# Patient Record
Sex: FEMALE | Race: BLACK OR AFRICAN AMERICAN | NOT HISPANIC OR LATINO | Employment: FULL TIME | URBAN - METROPOLITAN AREA
[De-identification: names, ages, dates, MRNs, and addresses within clinical notes are randomized per-mention and may not be internally consistent; named-entity substitution may affect disease eponyms.]

---

## 2023-12-12 ENCOUNTER — HOSPITAL ENCOUNTER (EMERGENCY)
Facility: HOSPITAL | Age: 38
Discharge: HOME | End: 2023-12-12
Payer: COMMERCIAL

## 2023-12-12 ENCOUNTER — PHARMACY VISIT (OUTPATIENT)
Dept: PHARMACY | Facility: CLINIC | Age: 38
End: 2023-12-12
Payer: MEDICAID

## 2023-12-12 ENCOUNTER — APPOINTMENT (OUTPATIENT)
Dept: RADIOLOGY | Facility: HOSPITAL | Age: 38
End: 2023-12-12
Payer: COMMERCIAL

## 2023-12-12 VITALS
HEART RATE: 100 BPM | BODY MASS INDEX: 25.69 KG/M2 | TEMPERATURE: 98.8 F | WEIGHT: 145 LBS | HEIGHT: 63 IN | DIASTOLIC BLOOD PRESSURE: 83 MMHG | SYSTOLIC BLOOD PRESSURE: 122 MMHG | OXYGEN SATURATION: 100 % | RESPIRATION RATE: 18 BRPM

## 2023-12-12 DIAGNOSIS — R10.9 FLANK PAIN: Primary | ICD-10-CM

## 2023-12-12 DIAGNOSIS — N30.00 ACUTE CYSTITIS WITHOUT HEMATURIA: ICD-10-CM

## 2023-12-12 DIAGNOSIS — J02.8 PHARYNGITIS DUE TO OTHER ORGANISM: ICD-10-CM

## 2023-12-12 LAB
ANION GAP SERPL CALC-SCNC: 15 MMOL/L (ref 10–20)
APPEARANCE UR: ABNORMAL
BASOPHILS # BLD AUTO: 0.02 X10*3/UL (ref 0–0.1)
BASOPHILS NFR BLD AUTO: 0.2 %
BILIRUB UR STRIP.AUTO-MCNC: NEGATIVE MG/DL
BUN SERPL-MCNC: 10 MG/DL (ref 6–23)
CALCIUM SERPL-MCNC: 9.1 MG/DL (ref 8.6–10.3)
CHLORIDE SERPL-SCNC: 103 MMOL/L (ref 98–107)
CO2 SERPL-SCNC: 22 MMOL/L (ref 21–32)
COLOR UR: ABNORMAL
CREAT SERPL-MCNC: 0.54 MG/DL (ref 0.5–1.05)
EOSINOPHIL # BLD AUTO: 0.01 X10*3/UL (ref 0–0.7)
EOSINOPHIL NFR BLD AUTO: 0.1 %
ERYTHROCYTE [DISTWIDTH] IN BLOOD BY AUTOMATED COUNT: 12.4 % (ref 11.5–14.5)
GFR SERPL CREATININE-BSD FRML MDRD: >90 ML/MIN/1.73M*2
GLUCOSE SERPL-MCNC: 96 MG/DL (ref 74–99)
GLUCOSE UR STRIP.AUTO-MCNC: NEGATIVE MG/DL
HCG UR QL IA.RAPID: NEGATIVE
HCT VFR BLD AUTO: 37 % (ref 36–46)
HGB BLD-MCNC: 12.3 G/DL (ref 12–16)
HOLD SPECIMEN: NORMAL
HYALINE CASTS #/AREA URNS AUTO: ABNORMAL /LPF
IMM GRANULOCYTES # BLD AUTO: 0.02 X10*3/UL (ref 0–0.7)
IMM GRANULOCYTES NFR BLD AUTO: 0.2 % (ref 0–0.9)
KETONES UR STRIP.AUTO-MCNC: ABNORMAL MG/DL
LEUKOCYTE ESTERASE UR QL STRIP.AUTO: ABNORMAL
LYMPHOCYTES # BLD AUTO: 1.97 X10*3/UL (ref 1.2–4.8)
LYMPHOCYTES NFR BLD AUTO: 23.9 %
MCH RBC QN AUTO: 25.4 PG (ref 26–34)
MCHC RBC AUTO-ENTMCNC: 33.2 G/DL (ref 32–36)
MCV RBC AUTO: 76 FL (ref 80–100)
MONOCYTES # BLD AUTO: 0.89 X10*3/UL (ref 0.1–1)
MONOCYTES NFR BLD AUTO: 10.8 %
MUCOUS THREADS #/AREA URNS AUTO: ABNORMAL /LPF
NEUTROPHILS # BLD AUTO: 5.33 X10*3/UL (ref 1.2–7.7)
NEUTROPHILS NFR BLD AUTO: 64.8 %
NITRITE UR QL STRIP.AUTO: NEGATIVE
NRBC BLD-RTO: 0 /100 WBCS (ref 0–0)
PH UR STRIP.AUTO: 5 [PH]
PLATELET # BLD AUTO: 249 X10*3/UL (ref 150–450)
POTASSIUM SERPL-SCNC: 3.5 MMOL/L (ref 3.5–5.3)
PROT UR STRIP.AUTO-MCNC: ABNORMAL MG/DL
RBC # BLD AUTO: 4.85 X10*6/UL (ref 4–5.2)
RBC # UR STRIP.AUTO: NEGATIVE /UL
RBC #/AREA URNS AUTO: ABNORMAL /HPF
S PYO DNA THROAT QL NAA+PROBE: NOT DETECTED
SODIUM SERPL-SCNC: 136 MMOL/L (ref 136–145)
SP GR UR STRIP.AUTO: 1.03
SQUAMOUS #/AREA URNS AUTO: ABNORMAL /HPF
UROBILINOGEN UR STRIP.AUTO-MCNC: 2 MG/DL
WBC # BLD AUTO: 8.2 X10*3/UL (ref 4.4–11.3)
WBC #/AREA URNS AUTO: ABNORMAL /HPF

## 2023-12-12 PROCEDURE — 2500000004 HC RX 250 GENERAL PHARMACY W/ HCPCS (ALT 636 FOR OP/ED): Performed by: NURSE PRACTITIONER

## 2023-12-12 PROCEDURE — 85025 COMPLETE CBC W/AUTO DIFF WBC: CPT | Performed by: NURSE PRACTITIONER

## 2023-12-12 PROCEDURE — RXMED WILLOW AMBULATORY MEDICATION CHARGE

## 2023-12-12 PROCEDURE — 36415 COLL VENOUS BLD VENIPUNCTURE: CPT | Performed by: NURSE PRACTITIONER

## 2023-12-12 PROCEDURE — 96361 HYDRATE IV INFUSION ADD-ON: CPT

## 2023-12-12 PROCEDURE — 96374 THER/PROPH/DIAG INJ IV PUSH: CPT

## 2023-12-12 PROCEDURE — 87086 URINE CULTURE/COLONY COUNT: CPT | Mod: PORLAB | Performed by: NURSE PRACTITIONER

## 2023-12-12 PROCEDURE — 74176 CT ABD & PELVIS W/O CONTRAST: CPT

## 2023-12-12 PROCEDURE — 81025 URINE PREGNANCY TEST: CPT | Performed by: NURSE PRACTITIONER

## 2023-12-12 PROCEDURE — 80048 BASIC METABOLIC PNL TOTAL CA: CPT | Performed by: NURSE PRACTITIONER

## 2023-12-12 PROCEDURE — 87651 STREP A DNA AMP PROBE: CPT | Performed by: NURSE PRACTITIONER

## 2023-12-12 PROCEDURE — 81001 URINALYSIS AUTO W/SCOPE: CPT | Performed by: NURSE PRACTITIONER

## 2023-12-12 PROCEDURE — 99284 EMERGENCY DEPT VISIT MOD MDM: CPT | Mod: 25

## 2023-12-12 PROCEDURE — 2500000001 HC RX 250 WO HCPCS SELF ADMINISTERED DRUGS (ALT 637 FOR MEDICARE OP): Performed by: NURSE PRACTITIONER

## 2023-12-12 PROCEDURE — 74176 CT ABD & PELVIS W/O CONTRAST: CPT | Mod: FOREIGN READ | Performed by: RADIOLOGY

## 2023-12-12 RX ORDER — KETOROLAC TROMETHAMINE 30 MG/ML
15 INJECTION, SOLUTION INTRAMUSCULAR; INTRAVENOUS ONCE
Status: COMPLETED | OUTPATIENT
Start: 2023-12-12 | End: 2023-12-12

## 2023-12-12 RX ORDER — DEXAMETHASONE 4 MG/1
10 TABLET ORAL ONCE
Status: COMPLETED | OUTPATIENT
Start: 2023-12-12 | End: 2023-12-12

## 2023-12-12 RX ORDER — CEPHALEXIN 250 MG/1
500 CAPSULE ORAL ONCE
Status: COMPLETED | OUTPATIENT
Start: 2023-12-12 | End: 2023-12-12

## 2023-12-12 RX ORDER — KETOROLAC TROMETHAMINE 30 MG/ML
INJECTION, SOLUTION INTRAMUSCULAR; INTRAVENOUS
Status: DISCONTINUED
Start: 2023-12-12 | End: 2023-12-12 | Stop reason: HOSPADM

## 2023-12-12 RX ORDER — CEPHALEXIN 500 MG/1
500 CAPSULE ORAL 4 TIMES DAILY
Qty: 28 CAPSULE | Refills: 0 | Status: SHIPPED | OUTPATIENT
Start: 2023-12-12 | End: 2023-12-19

## 2023-12-12 RX ADMIN — DEXAMETHASONE 10 MG: 4 TABLET ORAL at 20:24

## 2023-12-12 RX ADMIN — CEPHALEXIN 500 MG: 250 CAPSULE ORAL at 20:25

## 2023-12-12 RX ADMIN — KETOROLAC TROMETHAMINE 15 MG: 30 INJECTION, SOLUTION INTRAMUSCULAR at 15:37

## 2023-12-12 RX ADMIN — SODIUM CHLORIDE 1000 ML: 9 INJECTION, SOLUTION INTRAVENOUS at 15:38

## 2023-12-12 ASSESSMENT — PAIN - FUNCTIONAL ASSESSMENT: PAIN_FUNCTIONAL_ASSESSMENT: 0-10

## 2023-12-12 ASSESSMENT — LIFESTYLE VARIABLES
REASON UNABLE TO ASSESS: NO
EVER HAD A DRINK FIRST THING IN THE MORNING TO STEADY YOUR NERVES TO GET RID OF A HANGOVER: NO
HAVE YOU EVER FELT YOU SHOULD CUT DOWN ON YOUR DRINKING: NO
HAVE PEOPLE ANNOYED YOU BY CRITICIZING YOUR DRINKING: NO
EVER FELT BAD OR GUILTY ABOUT YOUR DRINKING: NO

## 2023-12-12 ASSESSMENT — COLUMBIA-SUICIDE SEVERITY RATING SCALE - C-SSRS
2. HAVE YOU ACTUALLY HAD ANY THOUGHTS OF KILLING YOURSELF?: NO
6. HAVE YOU EVER DONE ANYTHING, STARTED TO DO ANYTHING, OR PREPARED TO DO ANYTHING TO END YOUR LIFE?: NO
1. IN THE PAST MONTH, HAVE YOU WISHED YOU WERE DEAD OR WISHED YOU COULD GO TO SLEEP AND NOT WAKE UP?: NO

## 2023-12-12 ASSESSMENT — PAIN DESCRIPTION - LOCATION: LOCATION: THROAT

## 2023-12-12 ASSESSMENT — PAIN SCALES - GENERAL: PAINLEVEL_OUTOF10: 10 - WORST POSSIBLE PAIN

## 2023-12-12 NOTE — Clinical Note
Jethro Mosley was seen and treated in our emergency department on 12/12/2023.  She may return to work on 12/13/2023.       If you have any questions or concerns, please don't hesitate to call.      Shalonda Goldman, APRN-CNP

## 2023-12-12 NOTE — ED PROVIDER NOTES
Chief Complaint   Patient presents with    Flank Pain     Started yesterday. Denies hx of kidney stones. Had fever yesterday. Had sore throat for 2 days now.        HPI       38 year old female presents to the Emergency Department today complaining of a 2 day history of a sore throat. Notes that she has developed increased urinary frequency and right flank pain over the past 24 hours. Describes the pain as sharp in nature, constant, non-radiating, and varies in intensity. Denies any associated fever, chills, headache, neck pain, chest pain, shortness of breath, abdominal pain, nausea, vomiting, diarrhea, constipation, or other urinary symptoms.       History provided by:  Patient             Patient History   Past Medical History:   Diagnosis Date    Personal history of other diseases of the female genital tract     History of vaginitis     History reviewed. No pertinent surgical history.  No family history on file.  Social History     Tobacco Use    Smoking status: Not on file    Smokeless tobacco: Not on file   Substance Use Topics    Alcohol use: Not on file    Drug use: Not on file           Physical Exam  Constitutional:       Appearance: Normal appearance.   HENT:      Head: Normocephalic.      Right Ear: Tympanic membrane, ear canal and external ear normal.      Left Ear: Tympanic membrane, ear canal and external ear normal.      Nose: Nose normal.      Mouth/Throat:      Mouth: Mucous membranes are moist.      Pharynx: Oropharynx is clear. No oropharyngeal exudate, posterior oropharyngeal erythema or uvula swelling.      Tonsils: Tonsillar exudate present. No tonsillar abscesses. 3+ on the right. 3+ on the left.   Eyes:      Conjunctiva/sclera: Conjunctivae normal.      Pupils: Pupils are equal, round, and reactive to light.   Cardiovascular:      Rate and Rhythm: Normal rate and regular rhythm.      Pulses:           Radial pulses are 3+ on the right side and 3+ on the left side.        Dorsalis pedis  pulses are 3+ on the right side and 3+ on the left side.      Heart sounds: Normal heart sounds. No murmur heard.     No friction rub. No gallop.   Pulmonary:      Effort: Pulmonary effort is normal. No respiratory distress.      Breath sounds: Normal breath sounds. No wheezing, rhonchi or rales.   Abdominal:      General: Abdomen is flat. Bowel sounds are normal.      Palpations: Abdomen is soft.      Tenderness: There is no abdominal tenderness. There is no right CVA tenderness, left CVA tenderness, guarding or rebound. Negative signs include Flowers's sign and McBurney's sign.   Musculoskeletal:         General: No swelling or deformity.      Cervical back: Full passive range of motion without pain.      Right lower leg: No edema.      Left lower leg: No edema.   Lymphadenopathy:      Cervical: No cervical adenopathy.   Skin:     Capillary Refill: Capillary refill takes less than 2 seconds.      Coloration: Skin is not jaundiced.      Findings: No rash.   Neurological:      General: No focal deficit present.      Mental Status: She is alert and oriented to person, place, and time. Mental status is at baseline.      Gait: Gait is intact.   Psychiatric:         Mood and Affect: Mood normal.         Behavior: Behavior is cooperative.         Labs Reviewed   CBC WITH AUTO DIFFERENTIAL - Abnormal       Result Value    WBC 8.2      nRBC 0.0      RBC 4.85      Hemoglobin 12.3      Hematocrit 37.0      MCV 76 (*)     MCH 25.4 (*)     MCHC 33.2      RDW 12.4      Platelets 249      Neutrophils % 64.8      Immature Granulocytes %, Automated 0.2      Lymphocytes % 23.9      Monocytes % 10.8      Eosinophils % 0.1      Basophils % 0.2      Neutrophils Absolute 5.33      Immature Granulocytes Absolute, Automated 0.02      Lymphocytes Absolute 1.97      Monocytes Absolute 0.89      Eosinophils Absolute 0.01      Basophils Absolute 0.02     URINALYSIS WITH REFLEX MICROSCOPIC AND CULTURE - Abnormal    Color, Urine Carol (*)      Appearance, Urine Hazy (*)     Specific Gravity, Urine 1.029      pH, Urine 5.0      Protein, Urine 100 (2+) (*)     Glucose, Urine NEGATIVE      Blood, Urine NEGATIVE      Ketones, Urine 20 (1+) (*)     Bilirubin, Urine NEGATIVE      Urobilinogen, Urine 2.0 (*)     Nitrite, Urine NEGATIVE      Leukocyte Esterase, Urine TRACE (*)    MICROSCOPIC ONLY, URINE - Abnormal    WBC, Urine 11-20 (*)     RBC, Urine 1-2      Squamous Epithelial Cells, Urine 1-9 (SPARSE)      Mucus, Urine 4+      Hyaline Casts, Urine OCCASIONAL (*)    GROUP A STREPTOCOCCUS, PCR - Normal    Group A Strep PCR Not Detected     BASIC METABOLIC PANEL - Normal    Glucose 96      Sodium 136      Potassium 3.5      Chloride 103      Bicarbonate 22      Anion Gap 15      Urea Nitrogen 10      Creatinine 0.54      eGFR >90      Calcium 9.1     HCG, URINE, QUALITATIVE - Normal    HCG, Urine NEGATIVE     URINE CULTURE   URINALYSIS WITH REFLEX MICROSCOPIC AND CULTURE    Narrative:     The following orders were created for panel order Urinalysis with Reflex Microscopic and Culture.  Procedure                               Abnormality         Status                     ---------                               -----------         ------                     Urinalysis with Reflex M...[[id number]]  Abnormal            Final result               Extra Urine Gray Tube[[id number]]                            Final result                 Please view results for these tests on the individual orders.   EXTRA URINE GRAY TUBE    Extra Tube Hold for add-ons.         CT abdomen pelvis wo IV contrast   Final Result   1. No acute process. No renal/ureteral calculi.   2. Trace pelvic free fluid.   Signed by Rusty Delacruz MD               ED Course & MDM   Diagnoses as of 12/12/23 1814   Flank pain   Acute cystitis without hematuria           Medical Decision Making  Patient was seen and evaluated by myself in triage per [facility] protocol. History and physical was obtained.  Orders were placed based on such.     Karan HESS CNP     Patient was initially seen in triage department as a part of the triage process by NICKIE.  I did review all of her results.  She does have urinary frequency as well as some right flank pain.  She was given Toradol in the ED with improvement in the pain.  Repeat physical exam patient remains well-appearing in the emergency department.  There is no tachycardia.  She does have tonsillar exudates and swelling.  BMP is within normal limits there is no obvious peritonsillar abscess.  Urine is positive for leukocytes and white blood cells.  She is symptomatic her urine culture is pending.  CBC shows leukocytosis or signs of anemia.  CT shows no acute process or renal calculi trace pelvic fluid.  She is not having any pelvic pain.  Will prescribe patient Keflex 4 times a day x 7 days which will cover both urinary tract infection as well as pharyngitis.  Discussed results in depth with the patient to perform meds but she verbalized understanding of this plan has no further questions or concerns we discharged in stable condition.           Your medication list        START taking these medications        Instructions Last Dose Given Next Dose Due   cephalexin 500 mg capsule  Commonly known as: Keflex      Take 1 capsule (500 mg) by mouth 4 times a day for 7 days.                 Where to Get Your Medications        These medications were sent to [facility]  [address] [address]      Hours: 8AM to 6PM Mon-Fri, 8AM to 2PM Sat, 8AM to 12PM Sun Phone: [phone]   cephalexin 500 mg capsule           Procedure  Procedures     Shalonda Goldman, IFEANYI-MARIUSZ  12/12/23 5296

## 2023-12-13 LAB — BACTERIA UR CULT: NO GROWTH

## 2024-02-12 ENCOUNTER — APPOINTMENT (OUTPATIENT)
Dept: RADIOLOGY | Facility: CLINIC | Age: 39
End: 2024-02-12
Payer: COMMERCIAL

## 2024-02-12 ENCOUNTER — HOSPITAL ENCOUNTER (OUTPATIENT)
Dept: RADIOLOGY | Facility: HOSPITAL | Age: 39
Discharge: HOME | End: 2024-02-12
Payer: COMMERCIAL

## 2024-02-12 DIAGNOSIS — M70.32 OTHER BURSITIS OF ELBOW, LEFT ELBOW: ICD-10-CM

## 2024-02-12 DIAGNOSIS — Z12.31 ENCOUNTER FOR SCREENING MAMMOGRAM FOR MALIGNANT NEOPLASM OF BREAST: ICD-10-CM

## 2024-02-12 PROCEDURE — 77067 SCR MAMMO BI INCL CAD: CPT | Performed by: RADIOLOGY

## 2024-02-12 PROCEDURE — 77067 SCR MAMMO BI INCL CAD: CPT

## 2024-02-12 PROCEDURE — 77063 BREAST TOMOSYNTHESIS BI: CPT | Performed by: RADIOLOGY

## 2024-02-12 PROCEDURE — 73070 X-RAY EXAM OF ELBOW: CPT | Mod: LT

## 2024-02-23 ENCOUNTER — HOSPITAL ENCOUNTER (OUTPATIENT)
Dept: RADIOLOGY | Facility: CLINIC | Age: 39
Discharge: HOME | End: 2024-02-23
Payer: COMMERCIAL

## 2024-02-23 DIAGNOSIS — E05.90 THYROTOXICOSIS, UNSPECIFIED WITHOUT THYROTOXIC CRISIS OR STORM: ICD-10-CM

## 2024-02-23 PROCEDURE — 76536 US EXAM OF HEAD AND NECK: CPT | Performed by: STUDENT IN AN ORGANIZED HEALTH CARE EDUCATION/TRAINING PROGRAM

## 2024-02-23 PROCEDURE — 76536 US EXAM OF HEAD AND NECK: CPT

## 2024-03-18 ENCOUNTER — OFFICE VISIT (OUTPATIENT)
Dept: ORTHOPEDIC SURGERY | Facility: CLINIC | Age: 39
End: 2024-03-18
Payer: COMMERCIAL

## 2024-03-18 ENCOUNTER — PREP FOR PROCEDURE (OUTPATIENT)
Dept: ORTHOPEDIC SURGERY | Facility: CLINIC | Age: 39
End: 2024-03-18

## 2024-03-18 ENCOUNTER — HOSPITAL ENCOUNTER (OUTPATIENT)
Dept: RADIOLOGY | Facility: HOSPITAL | Age: 39
Discharge: HOME | End: 2024-03-18
Payer: COMMERCIAL

## 2024-03-18 DIAGNOSIS — M67.431 GANGLION CYST OF VOLAR ASPECT OF RIGHT WRIST: Primary | ICD-10-CM

## 2024-03-18 DIAGNOSIS — M25.531 WRIST PAIN, ACUTE, RIGHT: ICD-10-CM

## 2024-03-18 DIAGNOSIS — M77.02 MEDIAL EPICONDYLITIS OF ELBOW, LEFT: ICD-10-CM

## 2024-03-18 PROCEDURE — 73110 X-RAY EXAM OF WRIST: CPT | Mod: RIGHT SIDE | Performed by: RADIOLOGY

## 2024-03-18 PROCEDURE — L3908 WHO COCK-UP NONMOLDE PRE OTS: HCPCS | Performed by: ORTHOPAEDIC SURGERY

## 2024-03-18 PROCEDURE — 99204 OFFICE O/P NEW MOD 45 MIN: CPT | Performed by: ORTHOPAEDIC SURGERY

## 2024-03-18 PROCEDURE — 73110 X-RAY EXAM OF WRIST: CPT | Mod: RT

## 2024-03-18 NOTE — PROGRESS NOTES
New patient left elbow pain x a year no known injury xrays done 2/2024 and developed a cyst on right wrist - no xrays   Right hand dom

## 2024-03-18 NOTE — H&P (VIEW-ONLY)
38 y.o. female presents today for evaluation of left medial sided elbow pain and right volar wrist mass. The patient reports elbow pain for several months, getting worse, worse when pressure is put on it.  She also reports right wrist mass for about a year and a half getting more painful and worse.  Pain is controlled. Patient reports no numbness and tingling.  Reports no previous surgeries, injections, or trauma to the area.  Reports pain worse with use, better at rest.   Pain dull ache, sharp at times and elbow especially when she leans on things.  And the pain in her wrist is a dull ache that is worse with motion and use.  States she would like the right wrist mass removed as it has been around for a long time and not getting any better.    Review of Systems    Constitutional: see HPI, no fever, no chills, not feeling tired, no significant weight gain or weight loss.   Eyes: No vision changes  ENT: no nosebleeds.   Cardiovascular: no chest pain.   Respiratory: no shortness of breath and no cough.   Gastrointestinal: no abdominal pain, no nausea, no vomiting and no diarrhea.   Musculoskeletal: per HPI  Neurological: no headache, no gait disturbances  Psychiatric: no depression and no sleep disturbances.   Endocrine: no muscle weakness and no muscle cramps.   Hematologic/Lymphatic: no swollen glands and no tendency for easy bruising or excessive swelling.     Patient's past medical history, past surgical history, allergies, and medications have been reviewed unless otherwise noted in the chart.      Mass  Location: Right volar radial wrist specific: Ganglion cyst inspection (mass size): 1 cm x 1 cm palpation:  soft, mobile, compressible, Range of Motion:  full wrist and finger motion, Stability:  no instability noted, Strength:  5/5 hand and wrist, Skin:  intact, Vascular:  capillary refill <2 seconds distally, Sensation:  sensation intact to light touch distally.       Medial Epicondylitis  Inspection:  no  evidence of infection, no erythema, no edema, Palpation:  compartments are soft, positive pain with palpation over the medial epicondyle, Range of Motion:  full elbow motion, Stability:  no elbow instability noted, Strength:  5/5 elbow flexion/extension, Skin:  intact, Vascular:  capillary refill <2 seconds distally, Sensation:  intact to light touch distally, Tests:  pain with resisted wrist/finger flexion over the medial epicondyle.     Constitutional   General appearance: Alert and in no acute distress. Well developed, well nourished.    Eyes   External Eye, Conjunctiva and lids: Normal external exam - pupils were equal in size, round, reactive to light (PERRL) with normal accommodation and extraocular movements intact (EOMI).   Ears, Nose, Mouth, and Throat   Hearing: Normal.   Neck   Neck: No neck mass was observed. Supple.   Pulmonary   Respiratory effort: No respiratory distress.   Auscultation of lungs: Clear bilateral breath sounds.   Cardiovascular   Examination of extremities: No peripheral edema.   Auscultation of heart: Heart rate and rhythm were normal   Psychiatric   Judgment and insight: Intact.   Orientation to person, place, and time: Alert and oriented x 3.       Mood and affect: Normal.      X-ray of her left elbow shows no fractures dislocations or osseous abnormalities    Left medial epicondylitis  Based on the history, physical exam and imaging studies above, the patient's presentation is consistent with the above diagnosis.  I had a long discussion with the patient regarding their presentation and the treatment options.  We discussed initial nonoperative versus operative management options as well as potential further diagnostic imaging.  We again discussed her treatment options going forward along with their associated risks and benefits. After thorough discussion, the patient has elected to proceed with conservative management. All questions were answered to the patients satisfaction who  seems satisfied with the plan.  They will call the office with any questions/concerns.    Cock up wrist splint  Voltaren gel as needed  Hand master plus  Follow-up in 8 weeks as needed    Right volar wrist ganglion cyst versus mass  Surgery discussion: I discussed the diagnosis and treatment options with the patient today along with their associated risks and benefits. After thorough discussion, the patient has elected to proceed with surgical intervention. The patient understands the risks of,including, but not limited to, bleeding, infection, loss of life or limb, pain, permanent numbness, tingling, weakness, loss of motion, failure of the goals of surgery or the potential need for additional surgery. The patient would like to accept these risks and proceed. All questions were answered to the patients satisfaction and the patient seems satisfied with the plan.    Plan right wrist mass excision with 2 weeks splinting -I discussed conservative treatment including continued observation which is beyond the 1 year esau and will likely not provide any relief versus splinting and anti-inflammatory use.  She states she would rather have it removed which I think is reasonable given the chronicity of the mass  X-ray of her wrist on the way out  Follow-up 2 weeks after for suture removal and splint removal

## 2024-03-20 ENCOUNTER — ANESTHESIA EVENT (OUTPATIENT)
Dept: OPERATING ROOM | Facility: HOSPITAL | Age: 39
End: 2024-03-20
Payer: COMMERCIAL

## 2024-03-26 ENCOUNTER — PHARMACY VISIT (OUTPATIENT)
Dept: PHARMACY | Facility: CLINIC | Age: 39
End: 2024-03-26
Payer: MEDICAID

## 2024-03-26 ENCOUNTER — HOSPITAL ENCOUNTER (OUTPATIENT)
Facility: HOSPITAL | Age: 39
Setting detail: OUTPATIENT SURGERY
Discharge: HOME | End: 2024-03-26
Attending: ORTHOPAEDIC SURGERY | Admitting: ORTHOPAEDIC SURGERY
Payer: COMMERCIAL

## 2024-03-26 ENCOUNTER — ANESTHESIA (OUTPATIENT)
Dept: OPERATING ROOM | Facility: HOSPITAL | Age: 39
End: 2024-03-26
Payer: COMMERCIAL

## 2024-03-26 VITALS
HEIGHT: 63 IN | HEART RATE: 89 BPM | RESPIRATION RATE: 16 BRPM | BODY MASS INDEX: 25.69 KG/M2 | DIASTOLIC BLOOD PRESSURE: 80 MMHG | WEIGHT: 145 LBS | OXYGEN SATURATION: 100 % | SYSTOLIC BLOOD PRESSURE: 118 MMHG | TEMPERATURE: 97.5 F

## 2024-03-26 DIAGNOSIS — M67.431 GANGLION CYST OF VOLAR ASPECT OF RIGHT WRIST: Primary | ICD-10-CM

## 2024-03-26 LAB — PREGNANCY TEST URINE, POC: NEGATIVE

## 2024-03-26 PROCEDURE — 2500000004 HC RX 250 GENERAL PHARMACY W/ HCPCS (ALT 636 FOR OP/ED): Performed by: NURSE ANESTHETIST, CERTIFIED REGISTERED

## 2024-03-26 PROCEDURE — 7100000010 HC PHASE TWO TIME - EACH INCREMENTAL 1 MINUTE: Performed by: ORTHOPAEDIC SURGERY

## 2024-03-26 PROCEDURE — 3700000002 HC GENERAL ANESTHESIA TIME - EACH INCREMENTAL 1 MINUTE: Performed by: ORTHOPAEDIC SURGERY

## 2024-03-26 PROCEDURE — 3600000003 HC OR TIME - INITIAL BASE CHARGE - PROCEDURE LEVEL THREE: Performed by: ORTHOPAEDIC SURGERY

## 2024-03-26 PROCEDURE — 3700000001 HC GENERAL ANESTHESIA TIME - INITIAL BASE CHARGE: Performed by: ORTHOPAEDIC SURGERY

## 2024-03-26 PROCEDURE — 25076 EXC FOREARM TUM DEEP < 3 CM: CPT | Performed by: ORTHOPAEDIC SURGERY

## 2024-03-26 PROCEDURE — 2500000005 HC RX 250 GENERAL PHARMACY W/O HCPCS: Performed by: NURSE ANESTHETIST, CERTIFIED REGISTERED

## 2024-03-26 PROCEDURE — 2500000005 HC RX 250 GENERAL PHARMACY W/O HCPCS: Performed by: ORTHOPAEDIC SURGERY

## 2024-03-26 PROCEDURE — 88304 TISSUE EXAM BY PATHOLOGIST: CPT | Performed by: PATHOLOGY

## 2024-03-26 PROCEDURE — 0752T DGTZ GLS MCRSCP SLD LVL III: CPT | Mod: TC,PORLAB,WESLAB | Performed by: ORTHOPAEDIC SURGERY

## 2024-03-26 PROCEDURE — 81025 URINE PREGNANCY TEST: CPT | Performed by: ANESTHESIOLOGY

## 2024-03-26 PROCEDURE — 3600000008 HC OR TIME - EACH INCREMENTAL 1 MINUTE - PROCEDURE LEVEL THREE: Performed by: ORTHOPAEDIC SURGERY

## 2024-03-26 PROCEDURE — 7100000009 HC PHASE TWO TIME - INITIAL BASE CHARGE: Performed by: ORTHOPAEDIC SURGERY

## 2024-03-26 PROCEDURE — RXMED WILLOW AMBULATORY MEDICATION CHARGE

## 2024-03-26 PROCEDURE — 2500000004 HC RX 250 GENERAL PHARMACY W/ HCPCS (ALT 636 FOR OP/ED): Performed by: ANESTHESIOLOGY

## 2024-03-26 RX ORDER — OXYCODONE AND ACETAMINOPHEN 5; 325 MG/1; MG/1
1 TABLET ORAL EVERY 4 HOURS PRN
Status: DISCONTINUED | OUTPATIENT
Start: 2024-03-26 | End: 2024-03-26 | Stop reason: HOSPADM

## 2024-03-26 RX ORDER — FENTANYL CITRATE 50 UG/ML
INJECTION, SOLUTION INTRAMUSCULAR; INTRAVENOUS AS NEEDED
Status: DISCONTINUED | OUTPATIENT
Start: 2024-03-26 | End: 2024-03-26

## 2024-03-26 RX ORDER — HYDRALAZINE HYDROCHLORIDE 20 MG/ML
5 INJECTION INTRAMUSCULAR; INTRAVENOUS EVERY 30 MIN PRN
Status: DISCONTINUED | OUTPATIENT
Start: 2024-03-26 | End: 2024-03-26 | Stop reason: HOSPADM

## 2024-03-26 RX ORDER — MIDAZOLAM HYDROCHLORIDE 1 MG/ML
INJECTION, SOLUTION INTRAMUSCULAR; INTRAVENOUS AS NEEDED
Status: DISCONTINUED | OUTPATIENT
Start: 2024-03-26 | End: 2024-03-26

## 2024-03-26 RX ORDER — LABETALOL HYDROCHLORIDE 5 MG/ML
5 INJECTION, SOLUTION INTRAVENOUS ONCE AS NEEDED
Status: DISCONTINUED | OUTPATIENT
Start: 2024-03-26 | End: 2024-03-26 | Stop reason: HOSPADM

## 2024-03-26 RX ORDER — HYDROCODONE BITARTRATE AND ACETAMINOPHEN 5; 325 MG/1; MG/1
1 TABLET ORAL EVERY 6 HOURS PRN
Qty: 15 TABLET | Refills: 0 | Status: SHIPPED | OUTPATIENT
Start: 2024-03-26

## 2024-03-26 RX ORDER — LIDOCAINE HYDROCHLORIDE 5 MG/ML
INJECTION, SOLUTION INFILTRATION; INTRAVENOUS AS NEEDED
Status: DISCONTINUED | OUTPATIENT
Start: 2024-03-26 | End: 2024-03-26

## 2024-03-26 RX ORDER — FAMOTIDINE 10 MG/ML
20 INJECTION INTRAVENOUS ONCE
Status: COMPLETED | OUTPATIENT
Start: 2024-03-26 | End: 2024-03-26

## 2024-03-26 RX ORDER — BUPIVACAINE HCL/EPINEPHRINE 0.5-1:200K
VIAL (ML) INJECTION AS NEEDED
Status: DISCONTINUED | OUTPATIENT
Start: 2024-03-26 | End: 2024-03-26 | Stop reason: HOSPADM

## 2024-03-26 RX ORDER — DIPHENHYDRAMINE HYDROCHLORIDE 50 MG/ML
12.5 INJECTION INTRAMUSCULAR; INTRAVENOUS ONCE AS NEEDED
Status: DISCONTINUED | OUTPATIENT
Start: 2024-03-26 | End: 2024-03-26 | Stop reason: HOSPADM

## 2024-03-26 RX ORDER — CEFAZOLIN SODIUM 1 G/50ML
SOLUTION INTRAVENOUS AS NEEDED
Status: DISCONTINUED | OUTPATIENT
Start: 2024-03-26 | End: 2024-03-26

## 2024-03-26 RX ORDER — MEPERIDINE HYDROCHLORIDE 25 MG/ML
12.5 INJECTION INTRAMUSCULAR; INTRAVENOUS; SUBCUTANEOUS EVERY 10 MIN PRN
Status: DISCONTINUED | OUTPATIENT
Start: 2024-03-26 | End: 2024-03-26 | Stop reason: HOSPADM

## 2024-03-26 RX ORDER — SODIUM CHLORIDE, SODIUM LACTATE, POTASSIUM CHLORIDE, CALCIUM CHLORIDE 600; 310; 30; 20 MG/100ML; MG/100ML; MG/100ML; MG/100ML
100 INJECTION, SOLUTION INTRAVENOUS CONTINUOUS
Status: DISCONTINUED | OUTPATIENT
Start: 2024-03-26 | End: 2024-03-26 | Stop reason: HOSPADM

## 2024-03-26 RX ORDER — PROPOFOL 10 MG/ML
INJECTION, EMULSION INTRAVENOUS AS NEEDED
Status: DISCONTINUED | OUTPATIENT
Start: 2024-03-26 | End: 2024-03-26

## 2024-03-26 RX ORDER — ALBUTEROL SULFATE 0.83 MG/ML
2.5 SOLUTION RESPIRATORY (INHALATION) ONCE AS NEEDED
Status: DISCONTINUED | OUTPATIENT
Start: 2024-03-26 | End: 2024-03-26 | Stop reason: HOSPADM

## 2024-03-26 RX ORDER — LIDOCAINE HYDROCHLORIDE 10 MG/ML
0.1 INJECTION, SOLUTION EPIDURAL; INFILTRATION; INTRACAUDAL; PERINEURAL ONCE
Status: DISCONTINUED | OUTPATIENT
Start: 2024-03-26 | End: 2024-03-26 | Stop reason: HOSPADM

## 2024-03-26 RX ORDER — ONDANSETRON HYDROCHLORIDE 2 MG/ML
4 INJECTION, SOLUTION INTRAVENOUS ONCE AS NEEDED
Status: DISCONTINUED | OUTPATIENT
Start: 2024-03-26 | End: 2024-03-26 | Stop reason: HOSPADM

## 2024-03-26 RX ORDER — MORPHINE SULFATE 2 MG/ML
2 INJECTION, SOLUTION INTRAMUSCULAR; INTRAVENOUS EVERY 5 MIN PRN
Status: DISCONTINUED | OUTPATIENT
Start: 2024-03-26 | End: 2024-03-26 | Stop reason: HOSPADM

## 2024-03-26 RX ORDER — DROPERIDOL 2.5 MG/ML
0.62 INJECTION, SOLUTION INTRAMUSCULAR; INTRAVENOUS ONCE AS NEEDED
Status: DISCONTINUED | OUTPATIENT
Start: 2024-03-26 | End: 2024-03-26 | Stop reason: HOSPADM

## 2024-03-26 RX ADMIN — PROPOFOL 350 MG: 10 INJECTION, EMULSION INTRAVENOUS at 13:05

## 2024-03-26 RX ADMIN — CEFAZOLIN SODIUM 2 G: 1 INJECTION, SOLUTION INTRAVENOUS at 13:21

## 2024-03-26 RX ADMIN — FAMOTIDINE 20 MG: 10 INJECTION, SOLUTION INTRAVENOUS at 11:14

## 2024-03-26 RX ADMIN — LIDOCAINE HYDROCHLORIDE 30 ML: 5 INJECTION, SOLUTION INFILTRATION; INTRAVENOUS at 13:05

## 2024-03-26 RX ADMIN — FENTANYL CITRATE 50 MCG: 50 INJECTION INTRAMUSCULAR; INTRAVENOUS at 13:11

## 2024-03-26 RX ADMIN — MIDAZOLAM 2 MG: 1 INJECTION INTRAMUSCULAR; INTRAVENOUS at 12:54

## 2024-03-26 RX ADMIN — SODIUM CHLORIDE, POTASSIUM CHLORIDE, SODIUM LACTATE AND CALCIUM CHLORIDE 100 ML/HR: 600; 310; 30; 20 INJECTION, SOLUTION INTRAVENOUS at 11:15

## 2024-03-26 RX ADMIN — FENTANYL CITRATE 50 MCG: 50 INJECTION INTRAMUSCULAR; INTRAVENOUS at 13:07

## 2024-03-26 SDOH — HEALTH STABILITY: MENTAL HEALTH: CURRENT SMOKER: 1

## 2024-03-26 ASSESSMENT — PAIN - FUNCTIONAL ASSESSMENT: PAIN_FUNCTIONAL_ASSESSMENT: 0-10

## 2024-03-26 ASSESSMENT — PAIN SCALES - GENERAL
PAINLEVEL_OUTOF10: 0 - NO PAIN
PAIN_LEVEL: 0

## 2024-03-26 NOTE — OP NOTE
ORTHOPEDIC OPERATIVE NOTE    Name: Jethro Msoley  : 1985  Surgeon: Pieter Mae DO  Facility: [facility]  Date of Surgery: 24     SURGEON:  Pieter Mae DO  ASSISTANT:  None  PRE OP DIAGNOSIS:  Right Wrist Volar Mass (Ganglion Cyst)  POST OP DIAGNOSIS:  Same  PROCEDURE:   Right Wrist Volar Arthrotomy with Excision Mass (Ganglion Cyst)  ANESTHESIA:   Gibbs Block with IV Sedation  GROSS PATHOLOGY:               Ganglion cyst  BLOOD LOSS: Minimal    Indications: This is a 38-year-old female whose had a right wrist mass for over a year that is recently gotten worse.  She has tried activity modification and observation without improvement of her symptoms.  She understands the risks, benefits, complications, and reasonable expectations of undergoing surgical intervention and she was willing to proceed.  I think it is reasonable given that the mass has persisted for over a year and is causing her pain she no longer wants to tolerate and is limiting her from some activities.     PROCEDURE: The patient was taken to the operative suite where IV sedation was performed by the Department of Anesthesia in addition to a charles block.  Tourniquet inflated to 250 mm Hg. The hand was prepped and draped in the usual manner.    A longitudinal volar radial incision was created over the palpable cyst, about 4 cm in length. Dissection was taken through the skin sharply and then bluntly through the subcutaneous tissue to avoid injury to the superficial sensory nerves.    The ganglion cyst was identified and dissected bluntly through the subcutaneous tissue. The procedure was performed under 3.5 Loupe magnification, and great care was taken to avoid vital structures, especially the radial artery and its branches. Dissection was taken down to the volar wrist capsule.    Arthrotomy was then performed exposing the stalk of the cyst and the wrist joint. This area was debrided with a rongeur. Only a small portion of  capsule was excised to avoid any joint instability. Visualization of the wrist joint in this area demonstrated normal findings.    The cyst was sent to the Laboratory of analysis. The wound was thoroughly irrigated.  Capsule closed with 4-0 Vicryl .  Tourniquet deflated and hemostasis obtained with electrocautery and adequate perfusion returned to the hand.  Deep tissues closed with 4-0 vicryl.  The skin was closed with running 5-0 Nylon suture. I then injected the line of my incision with a combination of Xylocaine and Marcaine with Epinephrine totaling 10 cc.    A soft, bulky dressing with a volar wrist splint was applied and the patient was taken to the recovery room in satisfactory condition.    Plan: Patient will follow-up in the office in 2 weeks for wound check and splint removal.  They are given appropriate pain analgesia medication and discharge instructions      Electronically signed  Pieter Mae DO

## 2024-03-26 NOTE — ANESTHESIA PREPROCEDURE EVALUATION
Patient: Jethro Mosley    Procedure Information       Date/Time: 03/26/24 1200    Procedure: RIGHT WRIST GANGLION CYST EXCISION (Beir block/mac) (Right: Wrist) - LOCAL/MAC - 15 MINS    Location: [facility] OR 07 / Virtual [facility] OR    Surgeons: Ag Mae, DO            Relevant Problems   Anesthesia (within normal limits)       Clinical information reviewed:   Tobacco  Allergies  Meds   Med Hx  Surg Hx   Fam Hx  Soc Hx        NPO Detail:  No data recorded     Physical Exam    Airway  Mallampati: II  TM distance: >3 FB  Neck ROM: full     Cardiovascular - normal exam     Dental - normal exam     Pulmonary - normal exam     Abdominal - normal exam           Anesthesia Plan    History of general anesthesia?: yes  History of complications of general anesthesia?: no    ASA 2     MAC and regional   (MAC  Right [place] Block)  The patient is a current smoker.  Patient was previously instructed to abstain from smoking on day of procedure.  Patient did not smoke on day of procedure.    intravenous induction   Postoperative administration of opioids is intended.  Anesthetic plan and risks discussed with patient.  Use of blood products discussed with patient who.    Plan discussed with CRNA.

## 2024-03-26 NOTE — ANESTHESIA POSTPROCEDURE EVALUATION
Patient: Jethro Mosley    Procedure Summary       Date: 03/26/24 Room / Location: [facility] OR 01 / Virtual [facility] OR    Anesthesia Start: 1253 Anesthesia Stop: 1344    Procedure: RIGHT WRIST GANGLION CYST EXCISION (Beir block/mac) (Right: Wrist) Diagnosis:       Ganglion cyst of volar aspect of right wrist      (Ganglion cyst of volar aspect of right wrist [M67.431])    Surgeons: Ag Mae DO Responsible Provider: CHRISTO Dawn    Anesthesia Type: MAC, regional ASA Status: 2            Anesthesia Type: MAC, regional    Vitals Value Taken Time   /80 03/26/24 1431   Temp 36.4 °C (97.5 °F) 03/26/24 1420   Pulse 92 03/26/24 1434   Resp 16 03/26/24 1434   SpO2 100 % 03/26/24 1435   Vitals shown include unvalidated device data.    Anesthesia Post Evaluation    Patient location during evaluation: PACU  Patient participation: complete - patient participated  Level of consciousness: awake  Pain score: 0  Pain management: adequate  Airway patency: patent  Cardiovascular status: acceptable  Respiratory status: acceptable  Hydration status: acceptable  Postoperative Nausea and Vomiting: none    No notable events documented.

## 2024-03-26 NOTE — DISCHARGE INSTRUCTIONS
[facility]  [phone]   Fax: [phone] [address]    [address]  [address] [place], [place] [address] [place] [id number]    Upper Extremity   Post-Operatively (After Surgery) - Ganglion Cyst    1. Post-Operative Course    Following surgery, your surgeon may see your family in the family call room. Once stable, and in the Post Anesthesia Care Unit (PACU), you will be transported to your hospital room or allowed to go home if an outpatient procedure.     2. Dressing    You have a splint (hard casting material), do NOT remove the dressing until follow up. DO NOT GET DRESSING WET! Once at home, if your dressing, splint, or cast feels too tight or mistakenly gets wet, please contact the office. Further dressing instructions may be given at surgery.     If your sutures are visible (black strings), they will be removed about 10-14 days after surgery.  If you do not see any sutures, then they are absorbable and will not need to be removed.  In either case, you should have an appointment to see your physician 10-14 after surgery.    3. Activity     Most people underestimate the length of time required to fully recover from surgery. If you had a block (where your arm feels numb), the sensation typically returns around 18-24 hours later.  It is recommended you take some pain medication the evening following your surgery so when the block wears off (in the middle of the night), you are not in severe pain.   With pain medication, start at the lowest dose that controls your pain.       After the first 1-3 days, we encourage you to balance your activity between ambulation, sitting in a chair,   and resting in bed with your arm elevated. Let swelling and pain be your guide to activity, you should   avoid prolonged (over 20 minutes) standing or sitting. In general, limit your activities to home for the first   1-3 days.    4. Pain    You will be discharged to home with  a prescription for oral pain medication. A most important factor in pain control is rest and elevation. Ice may also be applied to the operative site for 30 minute intervals. Please use a waterproof bag for ice or cold packs.  Again, as you feel the numbness resolving and some onset of discomfort, please take pain medication, don't wait till full resolution of block.   Try to use the lowest dose of pain medication that controls your pain.      The pain medication may cause constipation. Drink plenty of fluids, eat fruits and vegetables. You may use an over the counter laxative or stool softener if necessary. Take pain medication with some food in your stomach, as prescribed by your pharmacist.     5. Elevation   Elevation of the operative extremity is critical. Elevation reduces swelling and minimizes pain. Less swelling is associated with a lower infectious rate, fewer wound complications, less post-operative stiffness, and more rapid recovery of function. To keep the swelling down, your hand must be kept above the level of your heart.

## 2024-03-26 NOTE — ANESTHESIA PROCEDURE NOTES
Peripheral Block    Patient location during procedure: OR  Start time: 3/26/2024 1:05 PM  End time: 3/26/2024 1:07 PM  Reason for block: at surgeon's request  Staffing  Performed: CRNA   Authorized by: CHRISTO Danw    Performed by: CHRISTO Dawn  Preanesthetic Checklist  Completed: patient identified, IV checked, site marked, risks and benefits discussed, surgical consent, monitors and equipment checked, pre-op evaluation and timeout performed   Timeout performed at: 3/26/2024 1:04 PM  Peripheral Block  Patient position: laying flat  Prep: Betadine  Patient monitoring: heart rate, cardiac monitor and continuous pulse ox  Block type: Kiko block  Laterality: right  Injection technique: single-shot  Local infiltration: lidocaine  Infiltration strength: 0.5 %  Dose: 30 mL  Needle  Test dose: negative  Assessment  Injection assessment: negative aspiration for heme  Paresthesia pain: none  Heart rate change: no  Slow fractionated injection: yes

## 2024-04-02 LAB
LABORATORY COMMENT REPORT: NORMAL
PATH REPORT.FINAL DX SPEC: NORMAL
PATH REPORT.GROSS SPEC: NORMAL
PATH REPORT.RELEVANT HX SPEC: NORMAL
PATH REPORT.TOTAL CANCER: NORMAL

## 2024-04-08 ENCOUNTER — OFFICE VISIT (OUTPATIENT)
Dept: ORTHOPEDIC SURGERY | Facility: CLINIC | Age: 39
End: 2024-04-08
Payer: COMMERCIAL

## 2024-04-08 DIAGNOSIS — M67.431 GANGLION CYST OF VOLAR ASPECT OF RIGHT WRIST: Primary | ICD-10-CM

## 2024-04-08 PROCEDURE — 99024 POSTOP FOLLOW-UP VISIT: CPT | Performed by: ORTHOPAEDIC SURGERY

## 2024-04-08 NOTE — PROGRESS NOTES
38 y.o. female presents today for for follow up after right volar wrist ganglion cyst excision. The patient reports doing well, no issues. The DOS was 2 weeks ago. Pain is controlled. Patient denies numbness and tingling.  Splint has been worn as directed.   Would like to go back to work    Review of Systems    Constitutional: see HPI, no fever, no chills, not feeling tired, no significant weight gain or weight loss.   Eyes: No vision changes  ENT: no nosebleeds.   Cardiovascular: no chest pain.   Respiratory: no shortness of breath and no cough.   Gastrointestinal: no abdominal pain, no nausea, no vomiting and no diarrhea.   Musculoskeletal: per HPI  Neurological: no headache, no gait disturbances  Psychiatric: no depression and no sleep disturbances.   Endocrine: no muscle weakness and no muscle cramps.   Hematologic/Lymphatic: no swollen glands and no tendency for easy bruising or excessive swelling.     Patient's past medical history, past surgical history, allergies, and medications have been reviewed unless otherwise noted in the chart.     Hand/Wrist Post-Op Exam  Inspection:  no evidence of infection, no erythema, Palpation:  compartments are soft, Range of Motion:  F/E 80/80, S/P 90/90 Finger ROM Full extension, full flexion Stability:  stable Strength:  5/5 F/E, 5/5 Adduction/Abduction 5/5 Opposition Skin:  incision site clean, dry and intact, healing without complication, Vascular:  capillary refill <2 seconds distally, Sensation:  intact to light touch distally.    Constitutional   General appearance: Alert and in no acute distress. Well developed, well nourished.    Eyes   External Eye, Conjunctiva and lids: Normal external exam - pupils were equal in size, round, reactive to light (PERRL) with normal accommodation and extraocular movements intact (EOMI).   Ears, Nose, Mouth, and Throat   Hearing: Normal.   Neck   Neck: No neck mass was observed. Supple.   Pulmonary   Respiratory effort: No respiratory  distress.   Cardiovascular   Examination of extremities: No peripheral edema.   Psychiatric   Judgment and insight: Intact.   Orientation to person, place, and time: Alert and oriented x 3.       Mood and affect: Normal.      Pathology showed ganglion cyst    2 weeks status post right wrist ganglion cyst excision  Based on the history, physical exam and imaging studies above, the patient's presentation is consistent with the above diagnosis.  I had a long discussion with the patient regarding their presentation and the treatment options.    We again discussed her treatment options going forward along with their associated risks and benefits. After thorough discussion, the patient has elected to proceed with postoperative care. All questions were answered to the patients satisfaction who seems satisfied with the plan.  They will call the office with any questions/concerns.     Sutures removed  Steris  Vitamin E cream prn to scar tissue  Activities as tolerated  FU 4-6 weeks prn - no XR

## 2024-04-08 NOTE — LETTER
April 8, 2024     Patient: Jethro Mosley   YOB: 1985   Date of Visit: 4/8/2024       To Whom It May Concern:    It is my medical opinion that Jethro Mosley  can't push, pull or lift anything more than 10lbs with the right hand for the next month then after that can return with no restrictions .    If you have any questions or concerns, please don't hesitate to call.         Sincerely,        Ag Mae,     CC: No Recipients

## 2024-04-08 NOTE — PROGRESS NOTES
Post op 3/26/2024 right wrist ganglion cyst removal   Suture removal today with no concerns   Patient needs a letter to go back to work today but unable to lift more than 10 lbs for a few weeks

## 2024-10-01 ENCOUNTER — APPOINTMENT (OUTPATIENT)
Dept: ENDOCRINOLOGY | Facility: CLINIC | Age: 39
End: 2024-10-01
Payer: COMMERCIAL

## (undated) DEVICE — SUTURE, ETHILON, 3-0, 18 IN, PS1, BLACK

## (undated) DEVICE — CORD, CAUTERY, BIOPOLAR FORCEP, 12FT

## (undated) DEVICE — DRAPE, SHEET, THREE QUARTER, FAN FOLD, 57 X 77 IN

## (undated) DEVICE — COVER HANDLE LIGHT, STERIS, BLUE, STERILE

## (undated) DEVICE — SOLUTION, IRRIGATION, SODIUM CHLORIDE 0.9%, 1000 ML, POUR BOTTLE

## (undated) DEVICE — APPLICATOR, CHLORAPREP, W/ORANGE TINT, 26ML

## (undated) DEVICE — GLOVE, SURGICAL, PROTEXIS PI BLUE W/NEUTHERA, 8.0, PF, LF

## (undated) DEVICE — SUTURE, VICRYL, 4-0, 18 IN, UNDYED BR PS-2

## (undated) DEVICE — GLOVE, PROTEXIS PI CLASSIC, SZ-8.0, PF, PF, LF